# Patient Record
Sex: FEMALE | Race: WHITE | NOT HISPANIC OR LATINO | Employment: PART TIME | URBAN - METROPOLITAN AREA
[De-identification: names, ages, dates, MRNs, and addresses within clinical notes are randomized per-mention and may not be internally consistent; named-entity substitution may affect disease eponyms.]

---

## 2017-12-18 ENCOUNTER — RESULTS ENCOUNTER (OUTPATIENT)
Dept: GASTROENTEROLOGY | Facility: CLINIC | Age: 22
End: 2017-12-18

## 2017-12-18 ENCOUNTER — LAB REQUISITION (OUTPATIENT)
Dept: LAB | Facility: HOSPITAL | Age: 22
End: 2017-12-18

## 2017-12-18 ENCOUNTER — OFFICE VISIT (OUTPATIENT)
Dept: GASTROENTEROLOGY | Facility: CLINIC | Age: 22
End: 2017-12-18

## 2017-12-18 VITALS
WEIGHT: 131.4 LBS | DIASTOLIC BLOOD PRESSURE: 80 MMHG | TEMPERATURE: 97.9 F | OXYGEN SATURATION: 98 % | HEART RATE: 91 BPM | SYSTOLIC BLOOD PRESSURE: 118 MMHG | BODY MASS INDEX: 22.43 KG/M2 | HEIGHT: 64 IN

## 2017-12-18 DIAGNOSIS — R19.4 CHANGE IN BOWEL HABITS: ICD-10-CM

## 2017-12-18 DIAGNOSIS — K92.1 MELENA: ICD-10-CM

## 2017-12-18 DIAGNOSIS — Z83.79 FAMILY HISTORY OF CROHN'S DISEASE: ICD-10-CM

## 2017-12-18 DIAGNOSIS — R19.4 CHANGE IN BOWEL HABITS: Primary | ICD-10-CM

## 2017-12-18 PROCEDURE — 99204 OFFICE O/P NEW MOD 45 MIN: CPT | Performed by: NURSE PRACTITIONER

## 2017-12-18 PROCEDURE — 36415 COLL VENOUS BLD VENIPUNCTURE: CPT

## 2017-12-18 RX ORDER — NICOTINE POLACRILEX 4 MG/1
20 GUM, CHEWING ORAL DAILY
COMMUNITY
Start: 2017-11-28

## 2017-12-18 RX ORDER — NORETHINDRONE ACETATE AND ETHINYL ESTRADIOL AND FERROUS FUMARATE 1MG-20(21)
1 KIT ORAL DAILY
COMMUNITY
Start: 2017-11-28

## 2017-12-18 NOTE — PROGRESS NOTES
OUTPATIENT NEW PATIENT NOTE  Patient: FIFI ZUNIGA : 1995  Date of Service: 2017  Dear No Known Provider   Thank you for your referral of this patient for evaluation of diarrhea  CC: Diarrhea (black stool)  Assessment/Plan                                             ASSESSMENT & PLANS     New onset of Melena and dark emesis  Long-term NSAIDS  Change in bowel habits w/ watery diarrhea  -     Esophagogastroduodenoscopy first so that we can get pt in faster.  May need to do colonoscopy  -     Calprotectin, Fecal - Stool, Per Rectum; Future  -     CBC Auto Differential; Future  -     C-reactive Protein; Future  -     Comprehensive Metabolic Panel; Future  -     Iron Profile; Future  -     Ferritin; Future  -     Fecal Lactoferrin - Stool, Per Rectum; Future  -     Occult Blood X 3, Stool - Stool, Per Rectum; Future  · Pt is counseled on avoiding NSAIDS products. I encouraged pt to discuss with PCP to seek alternative approach for mgt such as physical therapy, exercises, muscle relaxants, etc.  Pt is given precaution should pt continue any NSAID product.  Pt was advised that NSAIDS' potential side effects include, but limited to, gastrointestinal irritation including bleeding, thromboycytopenia, and kidney injuries. Pt was asked to take the medication with food and to stop if pt experiences any GI upset. Pt is to call me if experiencing vomit, abdominal pain, or black/bloody stools.     Family history of Crohn's disease Mother has crohn's    Follow Up: Pending results      DISCUSSION: [Addendum 18: Patient is scheduled for an EGD in 2 days on 18.  CBC, CRP, stools occult blood are negative. The patient’s case was discussed with Dr. Jimenes and updated Dr. Jimenes regarding recent results. I asked if we should change the procedure to a colonoscopy instead of an EGD first. Dr. Jimenes recommends keeping EGD as scheduled. ]    The above plan was delineated in details with patient adopted mom and  all questions and concerns were answered.  Patient is also given contact information.  Patient is to return as scheduled or sooner if new problems arise  Subjective                                                     SUBJECTIVE   History of Present Illness  Ms. Kirstie Perez is a 22 y.o. female presents to the clinic today for an evaluation of Diarrhea (black stool)  sx started 3 wks ago.  Pt saw PCP in [place].   Black stools, abd pain, diarrhea,     Baseline BM is about once a day. Now change in bowel habits w/ having about 3 BMs a day w/ watery stools.  No nocturnal diarrhea. No frequent abx or outside of [place] travel  Black tarry stools is a new change.   Has abscess tooth and vomit black emesis on a couple occasion  Intermittent abdominal burning feeling and intermittent cramps.   No hx of anemia, but low iron.  Has been getting oral pills, but no black stools in the past while on iron.   No recent labs since recent sx. Takes NSAIDS every other day for a month  Mother has crohn's. Omeprazole recently started by PCP. No wt loss. Hot flashes. No night sweat    ROS:Review of Systems   Constitutional: Positive for appetite change, chills, diaphoresis and fatigue. Negative for activity change, fever and unexpected weight change.   HENT: Negative for hearing loss, trouble swallowing and voice change.    Eyes: Positive for visual disturbance.   Respiratory: Positive for shortness of breath. Negative for cough, choking and chest tightness.    Cardiovascular: Positive for chest pain.   Gastrointestinal: Positive for abdominal distention (bloating), abdominal pain, diarrhea and rectal pain. Negative for anal bleeding, blood in stool, constipation, nausea and vomiting.        Heartburn/reflux   Endocrine: Negative for polydipsia and polyphagia.   Genitourinary: Negative.    Musculoskeletal: Negative for gait problem and joint swelling.   Skin: Negative for color change (pale) and rash.   Allergic/Immunologic: Negative  for food allergies.   Neurological: Negative for dizziness, seizures and speech difficulty.   Hematological: Negative for adenopathy.   Psychiatric/Behavioral: Negative for confusion.     Subjective   PAST MED HX: Pt  has a past medical history of Asthma; Hyperlipidemia; Hypertension; and Low iron.  PAST SURG HX: Pt  has no past surgical history on file.  FAM HX: family history includes Breast cancer in her mother; Crohn's disease in her mother.  SOC HX: Pt  reports that she has been smoking Cigarettes since age 12 w/ 2pp a day then now 1ppd.  She does not have any smokeless tobacco history on file. She reports that she does not drink alcohol.  Objective                                                           OBJECTIVE   Allergy: Pt has No Known Allergies.  MEDS: •  IRON PO, Take  by mouth Daily., Disp: , Rfl:   •  JUNEL FE 1/20 1-20 MG-MCG per tablet, Take 1 tablet by mouth Daily., Disp: , Rfl:   •  Multiple Vitamins-Minerals (MULTIVITAMIN ADULT PO), Take  by mouth Daily., Disp: , Rfl:   •  Omeprazole 20 MG tablet delayed-release, Take 20 mg by mouth Daily., Disp: , Rfl:      CRP (Normal 0.00 - 1.0 mg/dL or  0.00 - 10.0 mg/L)   Lab Results   Component Value Date    CRPQN 0.09 12/18/2017     Lab Results   Component Value Date    WBC 4.88 12/18/2017    HGB 14.7 12/18/2017    HCT 44.5 (H) 12/18/2017    MCV 91.9 12/18/2017     12/18/2017     Lab Results   Component Value Date    BUN 8 (L) 12/18/2017    CREATININE 0.60 12/18/2017    EGFRIFNONA 124 12/18/2017    EGFRIFAFRI 150 12/18/2017    BCR 13.3 12/18/2017    K 4.5 12/18/2017    CO2 29.0 12/18/2017    CALCIUM 9.2 12/18/2017    PROTENTOTREF 7.5 12/18/2017    ALBUMIN 4.60 12/18/2017    LABIL2 1.6 12/18/2017    AST 50 (H) 12/18/2017    ALT 66 (H) 12/18/2017     Stool Hemoccult Testing  Lab Results   Component Value Date    OCCULTBLOO Negative 12/20/2017    OCCULT2 Negative 12/20/2017    OCCULT3 Negative 12/20/2017     Fecal Lactoferrin (Normal: 0.00 -  7.24)  Lab Results   Component Value Date    LACTOQNT 126.30 (H) 12/18/2017        Wt Readings from Last 5 Encounters:   12/18/17 59.6 kg (131 lb 6.4 oz)   body mass index is 22.55 kg/(m^2).,Temp: 97.9 °F (36.6 °C),BP: 118/80,Heart Rate: 91   Physical Exam  General Well developed; well nourished; no acute distress.   ENT Oral mucosa pink and moist without thrush or lesions.    Neck Neck supple; trachea midline. No thyromegaly   Resp CTA; no rhonchi, rales, or wheezes.  Respiration effort normal  CV RRR; normal S1, S2; no M/R/G. No lower extremity edema  GI Abd soft, TTP in lower quadrants, ND, normal active bowel sounds.  No HSM.  No abd hernia  Skin No rash; no lesions; no bruises.  Skin turgor normal  Musc No clubbing; no cyanosis.  Gait steady  Psych Oriented to time, place, and person.  Appropriate affect  Thank you kindly for allowing me to be part of this patient’s care.    Pt care team: Aleta CISNEROS & [facility]--Gastroenterology  [phone]    CC: No Known Provider  [facility] [id number] FAX:None

## 2017-12-19 LAB
ALBUMIN SERPL-MCNC: 4.6 G/DL (ref 3.2–4.8)
ALBUMIN/GLOB SERPL: 1.6 G/DL (ref 1.5–2.5)
ALP SERPL-CCNC: 65 U/L (ref 25–100)
ALT SERPL-CCNC: 66 U/L (ref 7–40)
AST SERPL-CCNC: 50 U/L (ref 0–33)
BASOPHILS # BLD AUTO: 0.02 10*3/MM3 (ref 0–0.2)
BASOPHILS NFR BLD AUTO: 0.4 % (ref 0–1)
BILIRUB SERPL-MCNC: 0.4 MG/DL (ref 0.3–1.2)
BUN SERPL-MCNC: 8 MG/DL (ref 9–23)
BUN/CREAT SERPL: 13.3 (ref 7–25)
CALCIUM SERPL-MCNC: 9.2 MG/DL (ref 8.7–10.4)
CHLORIDE SERPL-SCNC: 103 MMOL/L (ref 99–109)
CO2 SERPL-SCNC: 29 MMOL/L (ref 20–31)
CREAT SERPL-MCNC: 0.6 MG/DL (ref 0.6–1.3)
CRP SERPL-MCNC: 0.09 MG/DL (ref 0–1)
EOSINOPHIL # BLD AUTO: 0.23 10*3/MM3 (ref 0–0.3)
EOSINOPHIL NFR BLD AUTO: 4.7 % (ref 0–3)
ERYTHROCYTE [DISTWIDTH] IN BLOOD BY AUTOMATED COUNT: 12.5 % (ref 11.3–14.5)
FERRITIN SERPL-MCNC: 68 NG/ML (ref 10–291)
GFR SERPLBLD CREATININE-BSD FMLA CKD-EPI: 124 ML/MIN/1.73
GFR SERPLBLD CREATININE-BSD FMLA CKD-EPI: 150 ML/MIN/1.73
GLOBULIN SER CALC-MCNC: 2.9 GM/DL
GLUCOSE SERPL-MCNC: 85 MG/DL (ref 70–100)
HCT VFR BLD AUTO: 44.5 % (ref 34.5–44)
HGB BLD-MCNC: 14.7 G/DL (ref 11.5–15.5)
IMM GRANULOCYTES # BLD: 0.01 10*3/MM3 (ref 0–0.03)
IMM GRANULOCYTES NFR BLD: 0.2 % (ref 0–0.6)
IRON SATN MFR SERPL: 43 % (ref 15–50)
IRON SERPL-MCNC: 153 MCG/DL (ref 50–175)
LYMPHOCYTES # BLD AUTO: 2.16 10*3/MM3 (ref 0.6–4.8)
LYMPHOCYTES NFR BLD AUTO: 44.3 % (ref 24–44)
MCH RBC QN AUTO: 30.4 PG (ref 27–31)
MCHC RBC AUTO-ENTMCNC: 33 G/DL (ref 32–36)
MCV RBC AUTO: 91.9 FL (ref 80–99)
MONOCYTES # BLD AUTO: 0.41 10*3/MM3 (ref 0–1)
MONOCYTES NFR BLD AUTO: 8.4 % (ref 0–12)
NEUTROPHILS # BLD AUTO: 2.05 10*3/MM3 (ref 1.5–8.3)
NEUTROPHILS NFR BLD AUTO: 42 % (ref 41–71)
PLATELET # BLD AUTO: 235 10*3/MM3 (ref 150–450)
POTASSIUM SERPL-SCNC: 4.5 MMOL/L (ref 3.5–5.5)
PROT SERPL-MCNC: 7.5 G/DL (ref 5.7–8.2)
RBC # BLD AUTO: 4.84 10*6/MM3 (ref 3.89–5.14)
SODIUM SERPL-SCNC: 139 MMOL/L (ref 132–146)
TIBC SERPL-MCNC: 360 MCG/DL (ref 250–450)
UIBC SERPL-MCNC: 207 MCG/DL
WBC # BLD AUTO: 4.88 10*3/MM3 (ref 3.5–10.8)

## 2017-12-20 ENCOUNTER — LAB (OUTPATIENT)
Dept: LAB | Facility: HOSPITAL | Age: 22
End: 2017-12-20

## 2017-12-20 DIAGNOSIS — R19.4 CHANGE IN BOWEL HABITS: ICD-10-CM

## 2017-12-20 DIAGNOSIS — K92.1 MELENA: ICD-10-CM

## 2017-12-20 LAB
COLLECT DATE SP2 STL: NORMAL
COLLECT DATE SP3 STL: NORMAL
COLLECT DATE STL: NORMAL
HEMOCCULT STL QL: NEGATIVE
Lab: 1100
Lab: 1600
Lab: 1630

## 2017-12-20 PROCEDURE — 82270 OCCULT BLOOD FECES: CPT

## 2017-12-27 ENCOUNTER — TELEPHONE (OUTPATIENT)
Dept: GASTROENTEROLOGY | Facility: CLINIC | Age: 22
End: 2017-12-27

## 2017-12-27 NOTE — TELEPHONE ENCOUNTER
----- Message from BLAYNE Marshall sent at 12/22/2017  3:58 PM EST -----  Pls let pt know that stool studies did not show any evidence of blood

## 2017-12-28 LAB
CALPROTECTIN STL-MCNT: <16 UG/G (ref 0–120)
LACTOFERRIN STL-MCNC: 126.3 UG/ML(G) (ref 0–7.24)

## 2017-12-29 ENCOUNTER — TELEPHONE (OUTPATIENT)
Dept: GASTROENTEROLOGY | Facility: CLINIC | Age: 22
End: 2017-12-29

## 2018-01-02 ENCOUNTER — TELEPHONE (OUTPATIENT)
Dept: GASTROENTEROLOGY | Facility: CLINIC | Age: 23
End: 2018-01-02

## 2018-01-02 NOTE — TELEPHONE ENCOUNTER
pls let pt know that blood results are normal. No anemia. There is mild liver enzymes elevation.    Keep EGD appt on Thurs

## 2018-01-04 ENCOUNTER — LAB REQUISITION (OUTPATIENT)
Dept: LAB | Facility: HOSPITAL | Age: 23
End: 2018-01-04

## 2018-01-04 ENCOUNTER — OUTSIDE FACILITY SERVICE (OUTPATIENT)
Dept: GASTROENTEROLOGY | Facility: CLINIC | Age: 23
End: 2018-01-04

## 2018-01-04 DIAGNOSIS — K21.9 GASTRO-ESOPHAGEAL REFLUX DISEASE WITHOUT ESOPHAGITIS: ICD-10-CM

## 2018-01-04 DIAGNOSIS — R10.84 GENERALIZED ABDOMINAL PAIN: ICD-10-CM

## 2018-01-04 PROCEDURE — 88305 TISSUE EXAM BY PATHOLOGIST: CPT | Performed by: INTERNAL MEDICINE

## 2018-01-04 PROCEDURE — 43239 EGD BIOPSY SINGLE/MULTIPLE: CPT | Performed by: INTERNAL MEDICINE

## 2018-01-06 LAB
CYTO UR: NORMAL
LAB AP CASE REPORT: NORMAL
LAB AP CLINICAL INFORMATION: NORMAL
Lab: NORMAL
PATH REPORT.FINAL DX SPEC: NORMAL
PATH REPORT.GROSS SPEC: NORMAL

## 2018-01-11 ENCOUNTER — TELEPHONE (OUTPATIENT)
Dept: GASTROENTEROLOGY | Facility: CLINIC | Age: 23
End: 2018-01-11

## 2018-01-11 NOTE — TELEPHONE ENCOUNTER
----- Message from Quincy Jimenes MD sent at 1/8/2018  6:47 AM EST -----  Please call Kirstie and inform her that she does not have H. pylori.  All of her laboratory studies were normal there was no no blood in her stool and her hemoglobin was normal.  She does not require a colonoscopy at this time.  Dr. Jimenes